# Patient Record
(demographics unavailable — no encounter records)

---

## 2017-02-02 NOTE — PDOC
History of Present Illness





- General


History Source: Patient


Exam Limitations: No Limitations





- History of Present Illness


Initial Comments: 





02/02/17 16:16


The patient is a 47 year old female, with a significant past medical history of 

right breast CA (2 years ago), who presents to the emergency department with 

left knee bakers cyst burst yesterday. She reports noticing a lump by her knee 

during physical therapy yesterday, which she notes the cyst burst later during 

therapy. She reports there was some blood draining after the bursting. She 

denies any clicking, locking, or catching in her knee. She denies any numbness 

and tingling in her LE. She denies fever, chills, headache and dizziness.





Allergies: Fentanyl 


Past surgical history: LEFT TKR (october). Mastectomy . 


Social history: denies EtOH, drug, and tobacco use. 








<Dale Velasquez - Last Filed: 02/02/17 16:16>





<Evi Cardoza - Last Filed: 02/02/17 16:45>





- General


Chief Complaint: Wound


Stated Complaint: BURST CYST ON LEFT KNEE


Time Seen by Provider: 02/02/17 16:02





Past History





<Dale Velasquez - Last Filed: 02/02/17 16:16>





- Past Medical History


Anemia: No


Asthma: No


Cancer: No (RT MASTECTOMY/TRAM 4/2011-FOLLOWED WITH CHEMO,RT)


Cardiac Disorders: No


CVA: No


COPD: No


CHF: No


Dementia: No


Diabetes: No


GI Disorders: Yes (HEARTBURN)


 Disorders: Yes (RENAL CYSTS)


HTN: Yes


Hypercholesterolemia: No


Liver Disease: No


Seizures: No


Thyroid Disease: No





- Surgical History


Abdominal Surgery: Yes (TRAM FLAP 2013)


Appendectomy: No


Cardiac Surgery: No


Cholecystectomy: Yes (2009)


Lung Surgery: No


Neurologic Surgery: No


Orthopedic Surgery: Yes (LEFT KNEE ARTHROSCOPY X 3. LETA TO NECK AND BACK-LAST 

INJECTION OVER 1 YEAR,)





- Psycho/Social/Smoking Cessation Hx


Anxiety: No


Suicidal Ideation: No


Smoking Status: No


Smoking History: Never smoked


Have you smoked in the past 12 months: No


Number of Cigarettes Smoked Daily: 0


Hx Alcohol Use: No


Drug/Substance Use Hx: No


Substance Use Type: None, Prescribed


Hx Substance Use Treatment: No





<Evi Cardoza - Last Filed: 02/02/17 16:45>





- Past Medical History


Allergies/Adverse Reactions: 


 Allergies











Allergy/AdvReac Type Severity Reaction Status Date / Time


 


adhesive Allergy Intermediate Swelling Verified 02/02/17 16:01


 


fentanyl AdvReac Severe "HALLUCINAT Verified 02/02/17 16:01





   IONS"  











Home Medications: 


Ambulatory Orders





Zolpidem Tartrate [Ambien] 10 mg PO HS PRN 01/23/12 


Docusate Sodium [Colace] 100 mg PO DAILY #0 capsule 01/30/12 


Diphenhydramine HCl [Benadryl Capsule -] 25 mg PO HS PRN 01/31/14 


Multivitamin [Multivitamins] 1 each PO DAILY 01/31/14 


Sertraline HCl [Zoloft -] 100 mg PO HS 01/31/14 


Tamoxifen Citrate 20 mg PO HS 01/31/14 


Gabapentin 400 mg PO TID 10/21/16 


Hydrochlorothiazide 25 mg PO HS 10/21/16 


Ibuprofen 800 mg PO TID PRN 10/21/16 


Oxycodone HCl [Oxycontin] 15 mg PO BID 10/21/16 


Amlodipine Besylate [Norvasc] 5 mg PO HS 10/31/16 


Diclofenac Epolamine [Flector] 1 each TD DAILY 10/31/16 


Omeprazole 20 mg PO HS 10/31/16 


Oxycodone HCl [Roxicodone -] 5 mg PO Q4H PRN #0 tablet MDD 6 11/02/16 


Oxycodone HCl [Roxicodone -] 10 mg PO Q4H PRN #0 tablet MDD 6 11/02/16 











**Review of Systems





- Review of Systems


Constitutional: No: Symptoms Reported


HEENTM: No: Symptoms Reported


Respiratory: No: Symptoms reported


Cardiac (ROS): No: Symptoms Reported


ABD/GI: No: Symptoms Reported


: No: Symptoms Reported


Musculoskeletal: Yes: Joint Pain (Right knee pain and cyst. )


Integumentary: No: Symptoms Reported


Neurological: No: Symptoms reported


All Other Systems: Reviewed and Negative





<Dale Velasquez - Last Filed: 02/02/17 16:16>





*Physical Exam





- Vital Signs


 Last Vital Signs











Temp Pulse Resp BP Pulse Ox


 


 97.5 F L  83   18   130/58   98 


 


 02/02/17 16:01  02/02/17 16:01  02/02/17 16:01  02/02/17 16:01  02/02/17 16:01














- Physical Exam


General Appearance: Yes: Nourished, Appropriately Dressed


HEENT: positive: EOMI, ELIZABETH, Normal ENT Inspection


Neck: positive: Supple


Respiratory/Chest: positive: Lungs Clear, Normal Breath Sounds


Cardiovascular: positive: Regular Rhythm, Regular Rate


Gastrointestinal/Abdominal: positive: Normal Bowel Sounds, Flat, Soft


Musculoskeletal: positive: Other (Surgical scar well healing. Slight 

hyperpigmentation. Small granulation tissue. )


Extremity: positive: Normal Capillary Refill, Normal Inspection


Integumentary: positive: Normal Color, Dry, Warm


Neurologic: positive: CNs II-XII NML intact, Fully Oriented, Alert, Normal Mood/

Affect, Normal Response, Motor Strength 5/5





<Dale Velasquez - Last Filed: 02/02/17 16:16>





*DC/Admit/Observation/Transfer





- Attestations


Scribe Attestion: 





02/02/17 16:19





Documentation prepared by Dale Velasquez, acting as medical scribe for Evi Cardoza MD.














<Dale Velasquez - Last Filed: 02/02/17 16:16>





- Discharge Dispostion


Admit: No





<Evi Cardoza - Last Filed: 02/02/17 16:45>


Diagnosis at time of Disposition: 


 Visit for wound check





- Discharge Dispostion


Disposition: HOME


Condition at time of disposition: Fair





- Referrals


Referrals: 


Bahman Bhandari MD [Staff Physician] - 





- Patient Instructions


Printed Discharge Instructions:  Minor Wounds (Alternative Therapy)


Additional Instructions: 


clean and dry , daily dressing change atbc cream Follow up with PMD

## 2018-02-13 NOTE — PN
Progress Note (short form)





- Note


Progress Note: 





Patient evaluated Post-op from surgery


VSS- AF, comfortable,  in OR


c/o soreness to the right forearm.  Neurologically intact, full distal 

sensation and motor function of all peripheral nerves.  Will observe.


Plan for sq heparin starting 6 hours post op, early ambulation.

## 2018-02-13 NOTE — OP
Operative Note





- Note:


Operative Date: 02/13/18


Pre-Operative Diagnosis: right breast cancer with deformity of reconstructed 

breast


Operation: Revision of right breast TRAM flap reconstruction and bilateral 

abdominal donor sites, Liposuction fat harvest from bilateral inner thighs, 

abdomen and back, Lipectomy of back fat


Findings: 





above


Post-Operative Diagnosis: Same as Pre-op


Surgeon: Goldberg,Neal D


Anesthesia: General


Estimated Blood Loss (mls): 200


Drains & Tubes with Location: AISHA x 2

## 2018-02-14 NOTE — PN
Progress Note (short form)





- Note


Progress Note: 





Anesthesia Post op


Pt seen and examined





S:Alert and awake


O:


 Vital Signs











Temperature  98.6 F   02/14/18 14:48


 


Pulse Rate  111 H  02/14/18 14:48


 


Respiratory Rate  18   02/14/18 14:48


 


Blood Pressure  104/68   02/14/18 14:48


 


O2 Sat by Pulse Oximetry (%)  97   02/13/18 22:20








 CBC, BMP





 02/14/18 11:50 








A/P: s/p revision right tram flap


Doing well post op


Continue current care


Gigi Valdes MD

## 2018-02-14 NOTE — PN
Progress Note (short form)





- Note


Progress Note: 





POD 1 post right breast revision


VSS AF until one temp this morning 102.3


Clinically without sign of fever, distress, infection


All wounds CDI


Drainage SS/thin, high output as expected


Ambulating, receiving SQ heparin


R arm pain resolving with no neurologic deficits





Will observe today, if remains afebrile will d/c this evening


Continue ambulation and DVT prophylaxis

## 2018-02-15 NOTE — PDOC
History of Present Illness





- General


History Source: Patient


Exam Limitations: No Limitations





- History of Present Illness


Initial Comments: 





02/15/18 23:21


The patient is a 47 year old female with a significant PMH of right breast CA s/

p recent right breast revision TRAM flap reconstruction and bilateral abdominal 

donor sites, liposuction from bilateral inner thighs, abdomen and back on 2/13/ 2018 who presents to the emergency department with fever (T. max 102F), 

generalized malaise, and bilateral LE swelling beginning approximately 

yesterday. The patient notes her bilateral LE swelling is worse in the right 

LE. The patient reports having a bilateral duplex US done yesterday which 

showed no evidence of DVT. The patients surgery was conducted by Dr. Neal Goldberg. She also notes constipation over the past week. 





The patient denies chest pain, shortness of breath, headache and dizziness. 


Denies chills, nausea, vomit, diarrhea.


Denies dysuria, frequency, urgency and hematuria.





Allergies: Adhesive, Fentanyl. 


Past surgical history: TRAM flap. (2013, 2018). Liposuction (2018). Left TKR. 

Mastectomy. Cholecystectomy.


Social history: No reported cigarette, alcohol, or drug use. 


Plastics: Dr. Neal Goldberg





<Amol Stevens - Last Filed: 02/16/18 00:44>





- General


History Source: Patient





<Nino Solares - Last Filed: 02/16/18 02:17>





- General


Chief Complaint: SIRS, Suspected/Possible


Stated Complaint: SWELLING OF LEG/FEVER


Time Seen by Provider: 02/15/18 22:27





Past History





<Amol Stevens - Last Filed: 02/16/18 00:44>





- Past Medical History


Anemia: No


Cancer: Yes (RT MASTECTOMY/TRAM 4/2011-FOLLOWED WITH CHEMO,RT)


COPD: No


GI Disorders: Yes (HEARTBURN)


 Disorders: Yes (RENAL CYSTS)


HTN: Yes


Psychiatric Problems: Yes (DEPRESSION)





- Surgical History


Abdominal Surgery: Yes (TRAM FLAP 2013)


Cholecystectomy: Yes (2009)


Orthopedic Surgery: Yes (LEFT KNEE ARTHROSCOPY X 3. LETA TO NECK AND BACK-LAST 

INJECTION OVER 1 YEAR,)





- Suicide/Smoking/Psychosocial Hx


Smoking Status: No


Smoking History: Never smoked


Have you smoked in the past 12 months: No


Number of Cigarettes Smoked Daily: 0


Information on smoking cessation initiated: No


Hx Alcohol Use: No


Drug/Substance Use Hx: No


Substance Use Type: None, Prescribed


Hx Substance Use Treatment: No





<BenisabrinaNino - Last Filed: 02/16/18 02:17>





- Past Medical History


Allergies/Adverse Reactions: 


 Allergies











Allergy/AdvReac Type Severity Reaction Status Date / Time


 


adhesive Allergy Intermediate Swelling Verified 02/13/18 07:46


 


fentanyl AdvReac Severe "HALLUCINAT Verified 02/13/18 07:46





   IONS"  











Home Medications: 


Ambulatory Orders





Zolpidem Tartrate [Ambien] 10 mg PO HS PRN 01/23/12 


Docusate Sodium [Colace] 100 mg PO DAILY #0 capsule 01/30/12 


Diphenhydramine HCl [Benadryl Capsule -] 25 mg PO HS PRN 01/31/14 


Multivitamin [Multivitamins] 1 each PO DAILY 01/31/14 


Sertraline HCl [Zoloft -] 100 mg PO HS 01/31/14 


Tamoxifen Citrate 20 mg PO HS 01/31/14 


Gabapentin 400 mg PO TID 10/21/16 


Hydrochlorothiazide 25 mg PO HS 10/21/16 


Amlodipine Besylate [Norvasc] 5 mg PO HS 10/31/16 


Diclofenac Epolamine [Flector] 1 each TD DAILY 10/31/16 


Omeprazole 20 mg PO HS 10/31/16 


oxyCODONE HCL [Roxicodone -] 10 mg PO Q4H PRN #0 tablet MDD 6 11/02/16 


Apixaban [Eliquis] 2.5 mg PO BID #30 tablet 02/15/18 











**Review of Systems





- Review of Systems


Able to Perform ROS?: Yes


Comments:: 





02/15/18 23:22


CONSTITUTIONAL: (+) Fever. (+) Generalized malaise. (+) Body aches.


Absent: chills, diaphoresis, generalized weakness, loss of appetite


HEENT: 


Absent: rhinorrhea, nasal congestion, throat pain, throat swelling, difficulty 

swallowing, mouth swelling, ear pain, eye pain, visual Changes


CARDIOVASCULAR: 


Absent: chest pain, syncope, palpitations, irregular heart rate, lightheadedness

, peripheral edema


RESPIRATORY: 


Absent: cough, shortness of breath, dyspnea with exertion, orthopnea, wheezing, 

stridor, hemoptysis


GASTROINTESTINAL: (+) Constipation.


Absent: abdominal pain, abdominal distension, nausea, vomiting, diarrhea, melena

, hematochezia


GENITOURINARY: 


Absent: dysuria, frequency, urgency, hesitancy, hematuria, flank pain, genital 

pain


MUSCULOSKELETAL: 


Absent: myalgia, arthralgia, joint swelling


SKIN: 


Absent: rash, itching, pallor


HEMATOLOGIC/IMMUNOLOGIC: 


Absent: easy bleeding, easy bruising, lymphadenopathy, frequent infections


ENDOCRINE:


Absent: unexplained weight gain, unexplained weight loss, heat intolerance, 

cold intolerance


NEUROLOGIC: 


Absent: headache, focal weakness or paresthesias, dizziness, unsteady gait, 

seizure, mental status changes, bladder or bowel incontinence


PSYCHIATRIC: 


Absent: anxiety, depression, suicidal or homicidal ideation, hallucinations.











<Amol Stevens - Last Filed: 02/16/18 00:44>





*Physical Exam





- Vital Signs


 Last Vital Signs











Temp Pulse Resp BP Pulse Ox


 


 99.8 F H  78   20   146/75   100 


 


 02/15/18 22:17  02/15/18 22:17  02/15/18 22:17  02/15/18 22:17  02/15/18 22:17














- Physical Exam


Comments: 





02/15/18 23:22


GENERAL:


Well developed, well nourished. Awake and alert. No acute distress.


HEENT:


Normocephalic, atraumatic. PERRLA, EOMI. No conjunctival pallor. Sclera are non-

icteric. Moist mucous membranes. Oropharynx is clear.


NECK: 


Supple. Full ROM. No JVD. Carotid pulses 2+ and symmetric, without bruits. No 

thyromegaly. No lymphadenopathy.


CARDIOVASCULAR: (+) Unable to examine chest as patient will not remove post-

surgical binder


PULMONARY: (+) Unable to examine chest as patient will not remove post-surgical 

binder


ABDOMINAL: (+) Unable to examine abdomen as patient will not remove post-

surgical binder


MUSCULOSKELETAL: 


Normal range of motion at all joints. No bony deformities or tenderness. No CVA 

tenderness.


EXTREMITIES: (+) Bilateral LE edema, right worse than left.


No cyanosis. No clubbing. No calf tenderness.


SKIN: 


Warm and dry. Normal capillary refill. No rashes. No jaundice. 


NEUROLOGICAL: 


Alert, awake, appropriate. Cranial nerves 2-12 intact. No deficits to light 

touch and temperature in face, upper extremities and lower extremities. No 

motor deficits in the in face, upper extremities and lower extremities. 

Normoreflexic in the upper and lower extremities. Normal speech. Toes are 

downgoing bilaterally. Gait is normal without ataxia.


PSYCHIATRIC: 


Cooperative. Good eye contact. Appropriate mood and affect.








<Amol Stevens - Last Filed: 02/16/18 00:44>





- Vital Signs


 Last Vital Signs











Temp Pulse Resp BP Pulse Ox


 


 99.8 F H  78   20   146/75   100 


 


 02/15/18 22:17  02/15/18 22:17  02/15/18 22:17  02/15/18 22:17  02/15/18 22:17














<Nino Solares - Last Filed: 02/16/18 02:17>





ED Treatment Course





- LABORATORY


CBC & Chemistry Diagram: 


 02/15/18 23:30





 02/15/18 23:30





<Amol Stevens - Last Filed: 02/16/18 00:44>





- LABORATORY


CBC & Chemistry Diagram: 


 02/15/18 23:30





 02/15/18 23:30





<Nino Solares - Last Filed: 02/16/18 02:17>





*DC/Admit/Observation/Transfer





- Attestations


Scribe Attestion: 





02/15/18 23:22





Documentation prepared by Amol Stevens, acting as medical scribe for Nino Solares DO. 





<Amol Stevens - Last Filed: 02/16/18 00:44>





- Discharge Dispostion


Admit: No





<Nino Solares - Last Filed: 02/16/18 02:17>


Diagnosis at time of Disposition: 


 Fever, Leg swelling








- Discharge Dispostion


Disposition: HOME


Condition at time of disposition: Stable





- Referrals


Referrals: 


Goldberg,Neil S [Primary Care Provider] - 





- Patient Instructions


Printed Discharge Instructions:  DI for Fever (Symptom) -- Adult


Additional Instructions: 


Please follow up with your surgeon to have your surgical wounds evaluated.  

REturn if any problems.  





- Post Discharge Activity

## 2018-02-15 NOTE — PN
Progress Note (short form)





- Note


Progress Note: 





POD 2


Patient with one episode of fever yesterday afternoon and only low grade since.


Ambulating, on SQ heparin, Duplex last night negative to the limits of the 

study.  No leg pain or swelling beyond the appropriate for the lipo


Clinically with no signs of infection:  All wounds checked with no drainage or 

erythema, no pain on urination, no cough


Arm pain is resolving with no neurologic symptoms.  Tolerating reg diet.


AISHA thin serous fluid





I have discussed with the patient that the likely source of her low grade temps 

within this short post op period is atelectasis and that she must be compliant 

with incentive spirometer (which she is)


I have discussed drain care and log with patient.  She is asking to go home, 

which is ok as long as she returns to ER if she has more fever, new pain, leg 

symptoms or SOB.  She will be discharged on eliquis antibiotics and pain meds.

## 2018-02-16 NOTE — PATH
Surgical Pathology Report



Patient Name:  OXANA GOMEZ

Accession #:  

Med. Rec. #:  [id number]                                                        

   /Age/Gender:  1969 (Age: 48) / F

Account:  [id number]                                                          

             Location: [facility] MED/[facility]UR[facility]

Taken:  2018

Received:  2018

Reported:  2018

Physicians:  Neal David Goldberg

  



Specimen(s) Received

 EXCESS DONOR SITE TISSUE 





Clinical History

Right breast cancer







Final Diagnosis

EXCESS DONOR SITE TISSUE, RIGHT BREAST REVISION:

BENIGN SKIN WITH SCAR AND UNDERLYING FIBROADIPOSE TISSUE.







***Electronically Signed***

Alka Cuevas M.D.





Gross Description

Received in formalin labeled "excess donor site tissue," is a 671 g, 21.0 x 18.5

x 4.0 cm aggregate of multiple irregular, unoriented portions of fibroadipose

tissue and brown skin. One of the portions of skin displays a possible well

healed scar. Sectioning reveals foci of white fibrous tissue. Representative

sections are submitted in 3 cassettes.

/2/15/2018



[facility]/2/15/2018

## 2018-02-16 NOTE — OP
DATE OF OPERATION:  02/13/2018

 

TITLE OF PROCEDURES:  

1.  Revision of right TRAM (transverse rectus abdominis) flap reconstructed breast by

rigottomy release of inset TRAM flap scars, reduction of lateral breast fold

deformity. 

2.  Free fat grafting to right TRAM flap for volume enhancement.

3.  Adjacent tissue transfer rearrangement of TRAM flap donor site deformity with

local skin and subcutaneous flaps.

 

ATTENDING SURGEON:  Neal Goldberg, MD

 

ASSISTANT:  There were no assistants.

 

ANESTHESIA:  General endotracheal anesthesia. 

 

The patient was marked in holding area, awake and aware of all incisions and

resulting scars, including those on fat donor sites as well as on the TRAM flap and

the abdominal donor site.  Patient was counseled on all risks, benefits, and

alternatives to the procedure, which she understood and agreed to proceed.  

 

PROCEDURE AS FOLLOWS:  Patient was brought to the operating room.  She was intubated

in a supine position.  Garcia catheter was placed, which was removed at the end of the

procedure.  Patient was then positioned properly in a prone position using chest

rolls, all padding and positional aids, and padding to pressure points.  She was then

prepped and draped in standard surgical fashion.   A time-out was called.  Patient,

procedure, incision sites were verified.  

 

At this point, patients back was infiltrated with wetting solution.  The wetting

solution was for the first 3 L 1 L of normal saline, 1 ampule of 1:1000 epinephrine,

and 2 mL of 1% lidocaine plain.  For the 2 nd 3 L of wetting solution, it was 1 L of

normal saline with 1 ampule of 1:1000 epinephrine per liter.  The tumescent solution

was infiltrated to the back and medial thighs.  A full 25 minutes was awaited for

hemostatic effect.  

 

At this point, the fat harvesting procedure was then performed using a combination of

5 and 4 mm cannulas, power-assisted liposuction machine with a Revolve Fat Harvesting

System.  The full yield of the fat harvest was 250 mL of useable injectable fat. 

However, the lipoaspirates were as follows:  On the left flank was 1050 mL, on the

right flank was 1400 mL, the left inner thigh 100 mL, the right inner thigh 100 mL,

the left upper back 800 mL, and the right upper back was 700 mL.  The end point was

the appearance of blood in the lipoaspirate and smooth, even contour.  As was

discussed with the patient, to correct loose skin rolls on the back, a direct

excision of skin rolls was to be performed.  This was performed bilaterally using

bilateral obliquely transverse scars on the back.  Excision of skin, fat is

performed.  The tissues were slightly undermined to release points of ligamentous

attachment, and the closure was performed with a series of interrupted buried fascial

system 2-0 Vicryl suture with a quilting element of the deep posterior chest wall. 

Additionally, a running 3-0 PDS V-Loc within the mid dermis was performed followed by

running subcuticular 3-0 Monocryl suture.  Each of these closures performed over a

size 10 round Javier drain, secured with a 3-0 silk drain suture.  

 

At this point, dressings were applied.  The patient was then turned to a supine

position onto stretcher, and she was then brought in supine position back onto the

operating table.  All extremities were secured.  A pillow was placed beneath the

knees.  Pressure points were carefully padded.  Position was checked and confirmed by

Anesthesia and nursing teams.  Patient was reprepped and draped.  At this point, the

further fat harvesting was performed, first by infiltration of wetting solution.  A

total of 3 L of wetting solution was injected here on the anterior surface, part of

the abdomen and medial thighs and flanks.   A full 20 minutes was awaited for

hemostatic effect of the wetting solution.  After which, fat was then further

harvested as it was on the posterior side adding to the injectable yield.  The

harvest was performed with a power-assisted liposuction machine using 5 and 4 mm

cannulas.  The lipoaspirates were from the right medial thigh 900 mL, from the left

medial thigh 700 mL, and from the abdomen 1550 mL. The total lipoaspirate for the

case was 6200 mL.  At this point, all liposuction was performed through 1 cm stab

wound incisions, all of which were closed with a series of interrupted 5-0 nylon

sutures.  

 

Using a 1.7-mm injection cannula, several points along the lateral border of the TRAM

flap were located, and 5-mm incisions were made for injection.  Newton technique of

multiple-pass small-volume injections was performed.  A total of 250 mL of fat was

injected into the right surrounding tissue of the TRAM flap as well as the TRAM flap

itself.  The superior depressed border of the TRAM flap was released with a direct

scar release rigottomy allowing for better upper pole slope.  The lateral border

included a distortion of lateral portion of the inframammary fold continuing onto the

back.  This was directly excised, recreating the inframammary fold laterally with a

series of interrupted sutures from the flap down to the lateral chest wall using 2-0

Vicryl suture.  Skin was then closed with a series of interrupted buried deep dermal

3-0 Monocryl suture followed by a running subcuticular 3-0 Monocryl suture.  This

recreated the lateral fold of the TRAM flap, and the fat grafting augmented the

volume and corrected for a superior pole deformity.  The injection sites were closed

with a series of interrupted 5-0 nylon sutures.  

 

The deformities of the TRAM flap abdominal wall donor site were then addressed. 

These were marked for excision.  The abdominal flap was mobilized medially in order

to correct for the lateral deformities, and closure was performed after excision of

skin and fat on the lateral lower portion of the abdominal TRAM flap donor site

deformity.  This was a skin and subcutaneous flap that on each side was roughly 50 sq

cm, 2 of these were performed with medial mobilization of the skin, fat, and Scarpas

layer fascia.  The Scarpas layer fascia was closed with a series of interrupted

buried 2-0 Vicryl suture.  Skin after being mobilized medially was closed with a

series of interrupted buried deep dermal 3-0 Monocryl sutures followed by running

subcuticular 3-0 Monocryl sutures.  This was performed bilaterally.  All incisions

were closed with 5-0 nylon sutures in addition to the flap rearrangements.  

 

At the completion of the procedure, all dressings were applied with Steri-Strips, 4x4

gauze, a compression abdominal and thigh garment was applied, a surgical bra was

applied.  Patient was awoken from anesthesia, transferred to recovery without

complications.  Garcia catheter was removed.  

 

 

NEAL GOLDBERG, M.D. [id number]

DD: 02/15/2018 19:02

DT: 02/16/2018 09:28

Job #:  [id number]